# Patient Record
Sex: FEMALE | Race: WHITE
[De-identification: names, ages, dates, MRNs, and addresses within clinical notes are randomized per-mention and may not be internally consistent; named-entity substitution may affect disease eponyms.]

---

## 2019-02-25 ENCOUNTER — HOSPITAL ENCOUNTER (OUTPATIENT)
Dept: HOSPITAL 92 - BICMAMMO | Age: 74
Discharge: HOME | End: 2019-02-25
Payer: MEDICARE

## 2019-02-25 DIAGNOSIS — Z12.31: Primary | ICD-10-CM

## 2019-02-25 DIAGNOSIS — Z85.3: ICD-10-CM

## 2019-02-25 DIAGNOSIS — Z80.3: ICD-10-CM

## 2019-02-25 DIAGNOSIS — R92.1: ICD-10-CM

## 2019-02-25 PROCEDURE — 77067 SCR MAMMO BI INCL CAD: CPT

## 2019-02-25 PROCEDURE — 77063 BREAST TOMOSYNTHESIS BI: CPT

## 2020-03-03 ENCOUNTER — HOSPITAL ENCOUNTER (OUTPATIENT)
Dept: HOSPITAL 92 - BICMAMMO | Age: 75
Discharge: HOME | End: 2020-03-03
Payer: MEDICARE

## 2020-03-03 DIAGNOSIS — Z12.31: Primary | ICD-10-CM

## 2020-03-03 DIAGNOSIS — Z91.89: ICD-10-CM

## 2020-03-03 DIAGNOSIS — Z98.890: ICD-10-CM

## 2020-03-03 PROCEDURE — 77067 SCR MAMMO BI INCL CAD: CPT

## 2020-03-03 PROCEDURE — 77063 BREAST TOMOSYNTHESIS BI: CPT

## 2020-03-03 NOTE — MMO
Bilateral MAMMO Bilat Screen [facility].

 

CLINICAL HISTORY:

Patient is 74 years old and is seen for screening. The patient has no family

history of breast cancer.  The patient has no personal history of cancer. The

patient has a history of right needle biopsy in January, 2013 and right

Lumpectomy in January, 2013 - malignant.

 

VIEWS:

The views performed were:  bilateral craniocaudal with tomosynthesis and

bilateral mediolateral oblique with tomosynthesis.

 

FILMS COMPARED:

The present examination has been compared to prior imaging studies performed at

[facility] on 02/15/2016, 02/16/2017, 02/21/2018 and 02/25/2019.

 

This study has been interpreted with the assistance of computer-aided detection.

 

MAMMOGRAM FINDINGS:

There are scattered fibroglandular densities.

 

There is a stable post-surgical scar seen in the right breast.

 

There are no suspicious masses, suspicious calcifications, or new areas of

architectural distortion.

 

IMPRESSION:

THERE IS NO MAMMOGRAPHIC EVIDENCE OF MALIGNANCY.

 

A ROUTINE FOLLOW-UP MAMMOGRAM IN 1 YEAR IS RECOMMENDED.

 

THE RESULTS OF THIS EXAM WERE SENT TO THE PATIENT.

 

ACR BI-RADS Category 2 - Benign finding

 

MAMMOGRAPHY NOTE:

 1. A negative mammogram report should not delay a biopsy if a dominant of

 clinically suspicious mass is present.

 2. Approximately 10% to 15% of breast cancers are not detected by

 mammography.

 3. Adenosis and dense breasts may obscure an underlying neoplasm.

 

 

Reported by: KIEL JUSTIN MD

Electonically Signed: [id number]